# Patient Record
Sex: FEMALE | Race: WHITE | NOT HISPANIC OR LATINO | URBAN - NONMETROPOLITAN AREA
[De-identification: names, ages, dates, MRNs, and addresses within clinical notes are randomized per-mention and may not be internally consistent; named-entity substitution may affect disease eponyms.]

---

## 2022-12-08 ENCOUNTER — OFFICE VISIT (OUTPATIENT)
Dept: BEHAVIORAL HEALTH | Facility: CLINIC | Age: 22
End: 2022-12-08

## 2022-12-08 DIAGNOSIS — F41.1 GENERALIZED ANXIETY DISORDER: Primary | ICD-10-CM

## 2022-12-08 PROCEDURE — 90791 PSYCH DIAGNOSTIC EVALUATION: CPT | Performed by: PSYCHOLOGIST

## 2022-12-08 NOTE — PROGRESS NOTES
"12/8/2022    This provider is located at the [facility] (through [facility]), [address], [place] [address] using a secure [employer] Video Visit through [employer]. Patient is being seen remotely via telehealth at their home address in [place], and stated they are in a secure environment for this session. The patient's condition being diagnosed/treated is appropriate for telemedicine. The provider identified herself as well as her credentials.   The patient, and/or patients guardian, consent to be seen remotely, and when consent is given they understand that the consent allows for patient identifiable information to be sent to a third party as needed.   They may refuse to be seen remotely at any time. The electronic data is encrypted and password protected, and the patient and/or guardian has been advised of the potential risks to privacy not withstanding such measures.    You have chosen to receive care through a telehealth visit.  Do you consent to use a video/audio connection for your medical care today? Yes    Vance Benitez, a 22 y.o. female, was seen today for initial appointment lasting 45 minutes.  11-11:45am CST  Patient is referred by WAYNE Ontiveros for an assessment related to ADHD.     She is 5'1\" and weighs 145 pounds.     SUBJECTIVE:  She is experiencing: worry, nervousness, easily upset, panic attacks, low tolerance to stress, poor coping skills, social isolation, irritability, inattention, hyperactivity, restlessness, fidgety, impulsivity, talkativeness, racing thoughts, mood swings, easily distracted, poor organizational skills, poor coping skills, interrupts others, quick temper, irritability, forgetfulness, and low tolerance to stress.    She received counseling services from Johnna at [facility] in [place], [place] (2020).    She was diagnosed ADHD when she was 12 years old in 2012.  She was prescribed Ritalin by Dr. Dial.   She is prescribed " Celexa.     She denied a history of emotional trauma. However, she noted that her father was manipulative.     FAMILY HISTORY:  ADHD- mother, half-brother, sister, maternal aunt, father,   MDD- father, paternal great uncle x3, maternal aunt, mother, sister  Anxiety- mother, sister, maternal aunt,   Alcohol- paternal great uncle x 4, paternal grandfather,   Drug- maternal aunt, paternal grandmother, paternal great aunt, father    The patient met all developmental milestones on time.    She described her physcial health as good.  She had her jordan bladder removed in 2021.     Her parents  in 1998.  The relationship produced 2 daughters ('98 and '02). She has an older maternal half-brotehr ('96).  Her parents  in 2012.  She lived in the home with her mother.     Her mother did not remarry.  The mother obtained custody of her niece ('18).     Her father did not remarry. He was in a dating relationship which produced a daughter ('16).     Vance never . She is in a relationship which produced a son ('20).  She lives in the home with her son and her boyfriend.      She graduated from [employer] in 2016. She did not attend college.   She is employed at [email or web address] in [place], [place] (2021).   She has a few friends.     MENTAL STATUS:  The patient was appropriately dressed and groomed.  The patient’s speech was WNL (rate, volume, articulation, coherence, etc.).  The patient was oriented to time, place, and person.  The patient’s memory (remote and recent) was intact.  The patient’s attention and concentration were WNL.  The patient’s mood and affect were congruent.    The patient’s thought content did not appear to possess delusions or hallucinations. These results do not appear to be significantly influenced by the effects of visual, auditory, or motor deficits, environmental/economic or cultural differences.  The patient denied SI/HI.        strengths: the patient is  polite and courteous  weakness: the patient is unable to manage symptoms   short term goal: the patient will reduce symptoms from 8 x day to 1 x week  long term goal: the patient will eliminate the above-mentioned symptoms    DIAGNOSIS:    ICD-10-CM ICD-9-CM   1. Generalized anxiety disorder  F41.1 300.02       ASSESSMENT PLAN:  She will complete the psychological assessment.   Copied text within this note has been reviewed and is accurate as of 12/08/22            This document has been electronically signed by Shlomo Alba, PhD on December 8, 2022 10:58 CST